# Patient Record
Sex: MALE | Race: BLACK OR AFRICAN AMERICAN | Employment: UNEMPLOYED | URBAN - METROPOLITAN AREA
[De-identification: names, ages, dates, MRNs, and addresses within clinical notes are randomized per-mention and may not be internally consistent; named-entity substitution may affect disease eponyms.]

---

## 2019-04-27 ENCOUNTER — HOSPITAL ENCOUNTER (EMERGENCY)
Age: 29
Discharge: HOME | End: 2019-04-27

## 2019-04-27 VITALS
WEIGHT: 180 LBS | OXYGEN SATURATION: 94 % | BODY MASS INDEX: 26.66 KG/M2 | DIASTOLIC BLOOD PRESSURE: 84 MMHG | TEMPERATURE: 98.1 F | SYSTOLIC BLOOD PRESSURE: 128 MMHG | HEIGHT: 69 IN | HEART RATE: 83 BPM | RESPIRATION RATE: 16 BRPM

## 2019-04-27 DIAGNOSIS — J45.31 MILD PERSISTENT ASTHMA WITH EXACERBATION: Primary | ICD-10-CM

## 2019-04-27 PROCEDURE — 6370000000 HC RX 637 (ALT 250 FOR IP): Performed by: PHYSICIAN ASSISTANT

## 2019-04-27 PROCEDURE — 94640 AIRWAY INHALATION TREATMENT: CPT

## 2019-04-27 PROCEDURE — 99284 EMERGENCY DEPT VISIT MOD MDM: CPT

## 2019-04-27 PROCEDURE — 94760 N-INVAS EAR/PLS OXIMETRY 1: CPT

## 2019-04-27 RX ORDER — BUDESONIDE AND FORMOTEROL FUMARATE DIHYDRATE 160; 4.5 UG/1; UG/1
2 AEROSOL RESPIRATORY (INHALATION) 2 TIMES DAILY
Qty: 1 INHALER | Refills: 1 | Status: SHIPPED | OUTPATIENT
Start: 2019-04-27 | End: 2020-01-02 | Stop reason: ALTCHOICE

## 2019-04-27 RX ORDER — ALBUTEROL SULFATE 90 UG/1
2 AEROSOL, METERED RESPIRATORY (INHALATION) EVERY 6 HOURS PRN
Qty: 1 INHALER | Refills: 1 | Status: SHIPPED | OUTPATIENT
Start: 2019-04-27

## 2019-04-27 RX ORDER — IPRATROPIUM BROMIDE AND ALBUTEROL SULFATE 2.5; .5 MG/3ML; MG/3ML
1 SOLUTION RESPIRATORY (INHALATION) ONCE
Status: COMPLETED | OUTPATIENT
Start: 2019-04-27 | End: 2019-04-27

## 2019-04-27 RX ORDER — PREDNISONE 20 MG/1
60 TABLET ORAL ONCE
Status: COMPLETED | OUTPATIENT
Start: 2019-04-27 | End: 2019-04-27

## 2019-04-27 RX ORDER — PREDNISONE 10 MG/1
60 TABLET ORAL DAILY
Qty: 30 TABLET | Refills: 0 | Status: SHIPPED | OUTPATIENT
Start: 2019-04-27 | End: 2019-05-02

## 2019-04-27 RX ADMIN — PREDNISONE 60 MG: 20 TABLET ORAL at 09:57

## 2019-04-27 RX ADMIN — IPRATROPIUM BROMIDE AND ALBUTEROL SULFATE 1 AMPULE: .5; 3 SOLUTION RESPIRATORY (INHALATION) at 09:51

## 2019-04-27 ASSESSMENT — ENCOUNTER SYMPTOMS
SHORTNESS OF BREATH: 1
ALLERGIC/IMMUNOLOGIC NEGATIVE: 1
COLOR CHANGE: 0
COUGH: 1
BACK PAIN: 0
ABDOMINAL DISTENTION: 0
ABDOMINAL PAIN: 0
WHEEZING: 1
DIARRHEA: 0
NAUSEA: 0
VOMITING: 0
STRIDOR: 0
CONSTIPATION: 0

## 2019-04-27 NOTE — ED PROVIDER NOTES
**EVALUATED BY ADVANCED PRACTICE PROVIDER**        [facility]  eMERGENCY dEPARTMENT eNCOUnter      Pt Name: Woody Rayo  [facility]:[id number]  [place] 1990  Date of evaluation: 4/27/2019  Provider: Terra Ovalle PA-C      Chief Complaint:    Chief Complaint   Patient presents with    Asthma     pt reports asthma flair up x1 week. typically uses symbicort but without issurance was $200 to fill and couldn't afford. Nursing Notes, Past Medical Hx, Past Surgical Hx, Social Hx, Allergies, and Family Hx were all reviewed and agreed with or any disagreements were addressed in the HPI.    HPI:  (Location, Duration, Timing, Severity,Quality, Assoc Sx, Context, Modifying factors)  This is a  34 y.o. male with history of asthma who presents to the emergency department stating that he ran out of symbicort several months ago and has not been able to fill it due to financial reasons. He reports coughing spells and wheezing for one week. He has no chest pain, palpitations, hemoptysis, fever, chills, abdominal pain, nausea, vomiting, pain or swelling in extremities, unexpected weight gain or weight loss. PastMedical/Surgical History:      Diagnosis Date    Asthma     Chlamydia 11/04/2016     History reviewed. No pertinent surgical history. Medications:  Discharge Medication List as of 4/27/2019  9:58 AM      CONTINUE these medications which have NOT CHANGED    Details   mometasone (ASMANEX 120 METERED DOSES) 220 MCG/INH inhaler Inhale 2 puffs into the lungs daily, Inhalation, DAILY Starting 2/16/2016, Until Thu 3/17/16, Disp-1 Inhaler, R-11, Normal      levalbuterol (XOPENEX HFA) 45 MCG/ACT inhaler Inhale 2 puffs into the lungs every 6 hours as needed for Wheezing, Disp-1 Inhaler, R-11               Review of Systems:  Review of Systems   Constitutional: Negative for chills and fever. HENT: Negative. Eyes: Negative for visual disturbance.    Respiratory: Positive for cough, shortness of breath and wheezing. Negative for stridor. Cardiovascular: Negative for chest pain, palpitations and leg swelling. Gastrointestinal: Negative for abdominal distention, abdominal pain, constipation, diarrhea, nausea and vomiting. Endocrine: Negative. Genitourinary: Negative. Musculoskeletal: Negative for back pain, neck pain and neck stiffness. Skin: Negative for color change, pallor, rash and wound. Allergic/Immunologic: Negative. Neurological: Negative for dizziness, tremors, seizures, syncope, facial asymmetry, speech difficulty, weakness, light-headedness, numbness and headaches. Hematological: Negative. Psychiatric/Behavioral: Negative for confusion. All other systems reviewed and are negative. Positives and Pertinent negatives as per HPI. Except as noted above in the ROS, problem specific ROS was completed and is negative. Physical Exam:  Physical Exam   Constitutional: He is oriented to person, place, and time. He appears well-developed and well-nourished. No distress. HENT:   Head: Normocephalic and atraumatic. Right Ear: External ear normal.   Left Ear: External ear normal.   Nose: Nose normal.   Mouth/Throat: Oropharynx is clear and moist.   Eyes: Pupils are equal, round, and reactive to light. Conjunctivae and EOM are normal. Right eye exhibits no discharge. Left eye exhibits no discharge. No scleral icterus. Neck: Normal range of motion. No JVD present. No Brudzinski's sign and no Kernig's sign noted. Cardiovascular: Normal rate and regular rhythm. Pulmonary/Chest: Effort normal. He has wheezes (diffusely scattered bilateral). He has no rales. Abdominal: Soft. Bowel sounds are normal. He exhibits no distension, no abdominal bruit and no pulsatile midline mass. There is no tenderness. There is no rigidity, no rebound, no guarding, no CVA tenderness, no tenderness at McBurney's point and negative Garduno's sign.    Musculoskeletal: Normal range of motion. No extremity edema, posterior calf or thigh tenderness, palpable cord, discoloration. Negative homans. Lymphadenopathy:     He has no cervical adenopathy. Neurological: He is alert and oriented to person, place, and time. Skin: Skin is warm and dry. Capillary refill takes less than 2 seconds. No rash noted. He is not diaphoretic. No erythema. No pallor. Psychiatric: He has a normal mood and affect. His behavior is normal.   Nursing note and vitals reviewed. MEDICAL DECISION MAKING    Vitals:    Vitals:    04/27/19 0922 04/27/19 0951   BP: 128/84    Pulse: 83    Resp: 16    Temp: 98.1 °F (36.7 °C)    TempSrc: Infrared    SpO2: 96% 94%   Weight: 180 lb (81.6 kg)    Height: 5' 9\" (1.753 m)        LABS:Labs Reviewed - No data to display     Remainder of labs reviewed and werenegative at this time or not returned at the time of this note. RADIOLOGY:   Non-plain film images such as CT, Ultrasound and MRI are read by the radiologist. Maged Marks PA-C have directly visualized the radiologic plain film image(s) with the below findings:        Interpretation per the Radiologist below, if available at the time of thisnote:    No orders to display        No results found. MEDICAL DECISION MAKING / ED COURSE:      PROCEDURES:   Procedures    None    Patient was given:     Medications   ipratropium-albuterol (DUONEB) nebulizer solution 1 ampule (1 ampule Inhalation Given 4/27/19 0951)   predniSONE (DELTASONE) tablet 60 mg (60 mg Oral Given 4/27/19 0957)       This patient presents complaining of cough, shortness of breath and wheezing. After breathing treatment and steroids, patient is feeling better and lung sounds are significantly improved. He is not significant hypoxic. I did refill his breathing treatments at home and added steroid to the regimen. I do not feel antibiotics are warranted.   My suspicion is low for pneumonia, pertussis,ACS, PE, myocarditis, pericarditis, endocarditis, acute pulmonary edema, pleural effusion, pericardial effusion, cardiac tamponade, cardiomyopathy, CHF exacerbation, thoracic aortic dissection, esophageal rupture, other life-threatening arrhythmia, hypertensive urgency or emergency, hemothorax, pulmonary contusion, subcutaneous emphysema, flail chest, pneumo mediastinum, rib fracture,acute strep pharyngitis, peritonsillar or tonsillar abscess, retropharyngeal abscess, bacterial tracheitis, epiglottitis, meningitis, encephalitis, foreign body, angioedema, anaphylaxis, mononucleosis, mumps, lymphoma, leukemia, asthma exacerbation, osteomyelitis, mastoiditis, sepsis, DKA, or other concerning pathology. We have addressed concerns and expectations. The patient tolerated their visit well. I evaluated the patient. The physician was available for consultation as needed. The patient and / or the family were informed of the results of anytests, a time was given to answer questions, a plan was proposed and they agreed with plan. CLINICAL IMPRESSION:  1.  Mild persistent asthma with exacerbation        DISPOSITION Decision To Discharge 04/27/2019 09:34:21 AM      PATIENT REFERRED TO:  [facility]) Pre-Services  [phone]          DISCHARGE MEDICATIONS:  Discharge Medication List as of 4/27/2019  9:58 AM      START taking these medications    Details   predniSONE (DELTASONE) 10 MG tablet Take 6 tablets by mouth daily for 5 doses, Disp-30 tablet, R-0Print             DISCONTINUED MEDICATIONS:  Discharge Medication List as of 4/27/2019  9:58 AM                 (Please note the MDM and HPI sections of this note were completed with a voice recognition program.  Efforts weremade to edit the dictations but occasionally words are mis-transcribed.)    Electronically signed, Soco Vergara PA-C,          Soco Vergara PA-C  04/27/19 1156

## 2019-04-27 NOTE — ED NOTES
Pt Discharged in stable condition, VSS, no signs of distress, discharge instructions and meds reviewed. Pt verbalizes understanding and states no further questions or concerns unaddressed. Pt ambulated to car by self.      Mya Hewitt RN  04/27/19 1002

## 2019-04-27 NOTE — ED NOTES
Pt states feeling better after breathing tx. Medicated per orders.       Anson Medeiros RN  04/27/19 6428

## 2019-07-10 ENCOUNTER — HOSPITAL ENCOUNTER (EMERGENCY)
Age: 29
Discharge: HOME | End: 2019-07-10

## 2019-07-10 VITALS
SYSTOLIC BLOOD PRESSURE: 130 MMHG | WEIGHT: 190 LBS | HEART RATE: 78 BPM | BODY MASS INDEX: 28.06 KG/M2 | TEMPERATURE: 98.2 F | OXYGEN SATURATION: 98 % | DIASTOLIC BLOOD PRESSURE: 84 MMHG | RESPIRATION RATE: 16 BRPM

## 2019-07-10 DIAGNOSIS — J45.21 MILD INTERMITTENT ASTHMA WITH ACUTE EXACERBATION: Primary | ICD-10-CM

## 2019-07-10 PROCEDURE — 6370000000 HC RX 637 (ALT 250 FOR IP): Performed by: PHYSICIAN ASSISTANT

## 2019-07-10 PROCEDURE — 94640 AIRWAY INHALATION TREATMENT: CPT

## 2019-07-10 PROCEDURE — 6360000002 HC RX W HCPCS: Performed by: PHYSICIAN ASSISTANT

## 2019-07-10 PROCEDURE — 94761 N-INVAS EAR/PLS OXIMETRY MLT: CPT

## 2019-07-10 PROCEDURE — 99284 EMERGENCY DEPT VISIT MOD MDM: CPT

## 2019-07-10 RX ORDER — PREDNISONE 20 MG/1
60 TABLET ORAL ONCE
Status: COMPLETED | OUTPATIENT
Start: 2019-07-10 | End: 2019-07-10

## 2019-07-10 RX ORDER — IPRATROPIUM BROMIDE AND ALBUTEROL SULFATE 2.5; .5 MG/3ML; MG/3ML
1 SOLUTION RESPIRATORY (INHALATION) ONCE
Status: COMPLETED | OUTPATIENT
Start: 2019-07-10 | End: 2019-07-10

## 2019-07-10 RX ORDER — LORATADINE 10 MG/1
10 TABLET ORAL DAILY
Qty: 30 TABLET | Refills: 0 | Status: SHIPPED | OUTPATIENT
Start: 2019-07-10

## 2019-07-10 RX ORDER — PREDNISONE 10 MG/1
60 TABLET ORAL DAILY
Qty: 30 TABLET | Refills: 0 | Status: SHIPPED | OUTPATIENT
Start: 2019-07-10 | End: 2019-07-15

## 2019-07-10 RX ADMIN — IPRATROPIUM BROMIDE AND ALBUTEROL SULFATE 1 AMPULE: .5; 3 SOLUTION RESPIRATORY (INHALATION) at 22:42

## 2019-07-10 RX ADMIN — PREDNISONE 60 MG: 20 TABLET ORAL at 22:25

## 2019-07-10 RX ADMIN — ALBUTEROL SULFATE 5 MG: 2.5 SOLUTION RESPIRATORY (INHALATION) at 22:42

## 2019-07-11 ASSESSMENT — ENCOUNTER SYMPTOMS
NAUSEA: 0
WHEEZING: 1
COUGH: 0
SHORTNESS OF BREATH: 1
VOMITING: 0
ABDOMINAL PAIN: 0

## 2019-07-11 NOTE — ED PROVIDER NOTES
 Chlamydia 2016         SURGICAL HISTORY   History reviewed. No pertinent surgical history. [address]       Discharge Medication List as of 7/10/2019 11:12 PM      CONTINUE these medications which have NOT CHANGED    Details   albuterol sulfate HFA (PROVENTIL HFA) 108 (90 Base) MCG/ACT inhaler Inhale 2 puffs into the lungs every 6 hours as needed for Wheezing or Shortness of Breath May substitute ProAir MDI, Disp-1 Inhaler, R-1Print      budesonide-formoterol (SYMBICORT) 160-4.5 MCG/ACT AERO Inhale 2 puffs into the lungs 2 times daily, Disp-1 Inhaler, R-1Print      mometasone (ASMANEX 120 METERED DOSES) 220 MCG/INH inhaler Inhale 2 puffs into the lungs daily, Inhalation, DAILY Starting 2016, Until Thu 3/17/16, Disp-1 Inhaler, R-11, Normal      levalbuterol (XOPENEX HFA) 45 MCG/ACT inhaler Inhale 2 puffs into the lungs every 6 hours as needed for Wheezing, Disp-1 Inhaler, R-11               ALLERGIES     Shellfish-derived products    FAMILYHISTORY     History reviewed. No pertinent family history.        SOCIAL HISTORY       Social History     Socioeconomic History    Marital status: Single     Spouse name: None    Number of children: None    Years of education: None    Highest education level: None   Occupational History    None   Social Needs    Financial resource strain: None    Food insecurity:     Worry: None     Inability: None    Transportation needs:     Medical: None     Non-medical: None   Tobacco Use    Smoking status: Former Smoker     Packs/day: 0.00     Last attempt to quit: 2014     Years since quittin.0    Smokeless tobacco: Never Used   Substance and Sexual Activity    Alcohol use: Yes     Comment: occasionally    Drug use: No    Sexual activity: Yes     Partners: Female   Lifestyle    Physical activity:     Days per week: None     Minutes per session: None    Stress: None   Relationships    Social connections:     Talks on phone: None     Gets together: None     Attends [employer] service: None     Active member of club or organization: None     Attends meetings of clubs or organizations: None     Relationship status: None    Intimate partner violence:     Fear of current or ex partner: None     Emotionally abused: None     Physically abused: None     Forced sexual activity: None   Other Topics Concern    None   Social History Narrative    None       SCREENINGS             PHYSICAL EXAM    (up to 7 for level 4, 8 or more for level 5)     ED Triage Vitals [07/10/19 2120]   BP Temp Temp src Pulse Resp SpO2 Height Weight   130/84 98.2 °F (36.8 °C) -- 78 16 98 % -- 190 lb (86.2 kg)       Physical Exam   Constitutional: He is oriented to person, place, and time. He appears well-developed and well-nourished. No distress. HENT:   Head: Normocephalic and atraumatic. Nose: Nose normal.   Mouth/Throat: Oropharynx is clear and moist.   Eyes: Conjunctivae and EOM are normal. Right eye exhibits no discharge. Left eye exhibits no discharge. Neck: Normal range of motion. Neck supple. Cardiovascular: Normal rate, regular rhythm and normal heart sounds. Exam reveals no gallop. No murmur heard. Pulmonary/Chest: Effort normal. No respiratory distress. He has wheezes in the right upper field, the right lower field, the left upper field and the left lower field. He has no rhonchi. He has no rales. Abdominal: Soft. Musculoskeletal: Normal range of motion. Neurological: He is alert and oriented to person, place, and time. Skin: Skin is warm and dry. Capillary refill takes less than 2 seconds. No rash noted. He is not diaphoretic. No erythema. No pallor. Psychiatric: He has a normal mood and affect. His behavior is normal.   Nursing note and vitals reviewed. DIAGNOSTIC RESULTS   LABS:    Labs Reviewed - No data to display    All other labs were within normal range or not returned as of this dictation. EKG:  All EKG's are interpreted by the Emergency

## 2019-07-11 NOTE — ED NOTES
Reviewed discharge instructions with patient. No questions at this time. No sign of distress. AOx3. Pt ambulated to ER exit with steady gait.         Jarod Nash RN  07/10/19 9952

## 2019-11-17 ENCOUNTER — HOSPITAL ENCOUNTER (EMERGENCY)
Age: 29
Discharge: HOME | End: 2019-11-17
Payer: MEDICAID

## 2019-11-17 ENCOUNTER — APPOINTMENT (OUTPATIENT)
Dept: GENERAL RADIOLOGY | Age: 29
End: 2019-11-17
Payer: MEDICAID

## 2019-11-17 VITALS
RESPIRATION RATE: 16 BRPM | HEIGHT: 69 IN | WEIGHT: 178.79 LBS | DIASTOLIC BLOOD PRESSURE: 82 MMHG | SYSTOLIC BLOOD PRESSURE: 144 MMHG | BODY MASS INDEX: 26.48 KG/M2 | OXYGEN SATURATION: 93 % | HEART RATE: 78 BPM | TEMPERATURE: 97.9 F

## 2019-11-17 DIAGNOSIS — J45.41 MODERATE PERSISTENT ASTHMA WITH EXACERBATION: Primary | ICD-10-CM

## 2019-11-17 PROCEDURE — 6360000002 HC RX W HCPCS: Performed by: PHYSICIAN ASSISTANT

## 2019-11-17 PROCEDURE — 94640 AIRWAY INHALATION TREATMENT: CPT

## 2019-11-17 PROCEDURE — 6370000000 HC RX 637 (ALT 250 FOR IP): Performed by: PHYSICIAN ASSISTANT

## 2019-11-17 PROCEDURE — 99285 EMERGENCY DEPT VISIT HI MDM: CPT

## 2019-11-17 PROCEDURE — 71046 X-RAY EXAM CHEST 2 VIEWS: CPT

## 2019-11-17 PROCEDURE — 94760 N-INVAS EAR/PLS OXIMETRY 1: CPT

## 2019-11-17 RX ORDER — PREDNISONE 10 MG/1
TABLET ORAL
Qty: 18 TABLET | Refills: 0 | Status: SHIPPED | OUTPATIENT
Start: 2019-11-17 | End: 2019-11-27

## 2019-11-17 RX ORDER — IPRATROPIUM BROMIDE AND ALBUTEROL SULFATE 2.5; .5 MG/3ML; MG/3ML
1 SOLUTION RESPIRATORY (INHALATION) ONCE
Status: COMPLETED | OUTPATIENT
Start: 2019-11-17 | End: 2019-11-17

## 2019-11-17 RX ORDER — MONTELUKAST SODIUM 10 MG/1
10 TABLET ORAL NIGHTLY
Qty: 30 TABLET | Refills: 0 | Status: SHIPPED | OUTPATIENT
Start: 2019-11-17

## 2019-11-17 RX ORDER — PREDNISONE 20 MG/1
40 TABLET ORAL ONCE
Status: COMPLETED | OUTPATIENT
Start: 2019-11-17 | End: 2019-11-17

## 2019-11-17 RX ORDER — ALBUTEROL SULFATE 2.5 MG/3ML
5 SOLUTION RESPIRATORY (INHALATION) ONCE
Status: COMPLETED | OUTPATIENT
Start: 2019-11-17 | End: 2019-11-17

## 2019-11-17 RX ADMIN — PREDNISONE 40 MG: 20 TABLET ORAL at 17:51

## 2019-11-17 RX ADMIN — IPRATROPIUM BROMIDE AND ALBUTEROL SULFATE 1 AMPULE: 2.5; .5 SOLUTION RESPIRATORY (INHALATION) at 17:24

## 2019-11-17 RX ADMIN — ALBUTEROL SULFATE 5 MG: 2.5 SOLUTION RESPIRATORY (INHALATION) at 17:24

## 2020-01-02 ENCOUNTER — APPOINTMENT (OUTPATIENT)
Dept: GENERAL RADIOLOGY | Age: 30
End: 2020-01-02
Payer: MEDICAID

## 2020-01-02 ENCOUNTER — HOSPITAL ENCOUNTER (EMERGENCY)
Age: 30
Discharge: HOME | End: 2020-01-02
Payer: MEDICAID

## 2020-01-02 VITALS
OXYGEN SATURATION: 96 % | BODY MASS INDEX: 25.99 KG/M2 | HEIGHT: 69 IN | DIASTOLIC BLOOD PRESSURE: 92 MMHG | SYSTOLIC BLOOD PRESSURE: 129 MMHG | WEIGHT: 175.44 LBS | RESPIRATION RATE: 16 BRPM | TEMPERATURE: 98.1 F | HEART RATE: 80 BPM

## 2020-01-02 LAB
RAPID INFLUENZA  B AGN: NEGATIVE
RAPID INFLUENZA A AGN: NEGATIVE

## 2020-01-02 PROCEDURE — 94760 N-INVAS EAR/PLS OXIMETRY 1: CPT

## 2020-01-02 PROCEDURE — 87804 INFLUENZA ASSAY W/OPTIC: CPT

## 2020-01-02 PROCEDURE — 6370000000 HC RX 637 (ALT 250 FOR IP): Performed by: PHYSICIAN ASSISTANT

## 2020-01-02 PROCEDURE — 71046 X-RAY EXAM CHEST 2 VIEWS: CPT

## 2020-01-02 PROCEDURE — 99284 EMERGENCY DEPT VISIT MOD MDM: CPT

## 2020-01-02 PROCEDURE — 94640 AIRWAY INHALATION TREATMENT: CPT

## 2020-01-02 RX ORDER — IPRATROPIUM BROMIDE AND ALBUTEROL SULFATE 2.5; .5 MG/3ML; MG/3ML
1 SOLUTION RESPIRATORY (INHALATION) ONCE
Status: COMPLETED | OUTPATIENT
Start: 2020-01-02 | End: 2020-01-02

## 2020-01-02 RX ORDER — ALBUTEROL SULFATE 90 UG/1
2 AEROSOL, METERED RESPIRATORY (INHALATION) EVERY 4 HOURS PRN
Qty: 1 INHALER | Refills: 0 | Status: SHIPPED | OUTPATIENT
Start: 2020-01-02

## 2020-01-02 RX ORDER — PREDNISONE 20 MG/1
60 TABLET ORAL ONCE
Status: COMPLETED | OUTPATIENT
Start: 2020-01-02 | End: 2020-01-02

## 2020-01-02 RX ORDER — PREDNISONE 10 MG/1
60 TABLET ORAL DAILY
Qty: 30 TABLET | Refills: 0 | Status: SHIPPED | OUTPATIENT
Start: 2020-01-02 | End: 2020-01-07

## 2020-01-02 RX ADMIN — PREDNISONE 60 MG: 20 TABLET ORAL at 16:49

## 2020-01-02 RX ADMIN — IPRATROPIUM BROMIDE AND ALBUTEROL SULFATE 1 AMPULE: .5; 3 SOLUTION RESPIRATORY (INHALATION) at 16:42

## 2020-01-02 ASSESSMENT — ENCOUNTER SYMPTOMS
CHEST TIGHTNESS: 1
RHINORRHEA: 0
DIARRHEA: 0
VOMITING: 0
WHEEZING: 1
SHORTNESS OF BREATH: 1
COUGH: 1
ABDOMINAL PAIN: 0
NAUSEA: 0

## 2020-01-02 NOTE — ED PROVIDER NOTES
[address] [facility]        Pt Name: Roe Abreu  MRN: [id number]  [place] 1990  Date of evaluation: 1/2/2020  Provider: Allen West PA-C  PCP: No primary care provider on file. This patient was not seen and evaluated by the attending physician No att. providers found. CHIEF COMPLAINT       Chief Complaint   Patient presents with    Asthma     Pt. comes in today with a possible asthma exacerbation. Pt. reports that he has been using his inhaler over the last couple of days with minimal relief. Pt. coughing up some yellow mucous. Pt. denies fever or chills. HISTORY OF PRESENT ILLNESS   (Location/Symptom, Timing/Onset, Context/Setting, Quality, Duration, Modifying Factors, Severity)  Note limiting factors. Roe Abreu is a 34 y.o. male history of asthma presents for evaluation of cough and congestion over the past several days. He states that he has been using his inhaler without significant symptomatic relief. Does have some chest tightness and wheezing. Coughing up yellow mucus. He denies fevers or chills. No abdominal pain, nausea or vomiting. No sore throat, difficulty swallowing, drooling or voice changes. No rashes. No known sick contacts with similar symptoms. The last was on oral steroids about 2 months ago. He has had to be admitted for similar symptoms in the past.  He is supposed to be taking Symbicort but states that he has not been able to afford this due to not having insurance. He has no other complaints or concerns at this time. Nursing Notes were all reviewed and agreed with or any disagreements were addressed in the HPI. REVIEW OF SYSTEMS    (2-9 systems for level 4, 10 or more for level 5)     Review of Systems   Constitutional: Negative for appetite change, chills and fever. HENT: Positive for congestion. Negative for rhinorrhea.     Respiratory: Positive for cough, chest tightness, shortness of breath and wheezing. Cardiovascular: Negative for chest pain. Gastrointestinal: Negative for abdominal pain, diarrhea, nausea and vomiting. Genitourinary: Negative for difficulty urinating, dysuria and hematuria. Musculoskeletal: Negative for neck pain and neck stiffness. Skin: Negative for rash. Neurological: Negative for headaches. Positives and Pertinent negatives as per HPI. Except as noted above in the ROS, all other systems were reviewed and negative. PAST MEDICAL HISTORY     Past Medical History:   Diagnosis Date    Asthma     Chlamydia 2016         SURGICAL HISTORY   History reviewed. No pertinent surgical history. CURRENTMEDICATIONS       Previous Medications    ALBUTEROL SULFATE HFA (PROVENTIL HFA) 108 (90 BASE) MCG/ACT INHALER    Inhale 2 puffs into the lungs every 6 hours as needed for Wheezing or Shortness of Breath May substitute ProAir MDI    LEVALBUTEROL (XOPENEX HFA) 45 MCG/ACT INHALER    Inhale 2 puffs into the lungs every 6 hours as needed for Wheezing    LORATADINE (CLARITIN) 10 MG TABLET    Take 1 tablet by mouth daily    MOMETASONE (ASMANEX 120 METERED DOSES) 220 MCG/INH INHALER    Inhale 2 puffs into the lungs daily    MONTELUKAST (SINGULAIR) 10 MG TABLET    Take 1 tablet by mouth nightly         ALLERGIES     Shellfish-derived products    FAMILYHISTORY     History reviewed. No pertinent family history.        SOCIAL HISTORY       Social History     Tobacco Use    Smoking status: Former Smoker     Packs/day: 0.00     Last attempt to quit: 2014     Years since quittin.5    Smokeless tobacco: Never Used   Substance Use Topics    Alcohol use: Yes     Comment: occasionally    Drug use: No       SCREENINGS             PHYSICAL EXAM    (up to 7 for level 4, 8 or more for level 5)     ED Triage Vitals [20 1617]   BP Temp Temp Source Pulse Resp SpO2 Height Weight   (!) 129/92 98.1 °F (36.7 °C) Infrared 80 16 95 % 5' note:    XR CHEST STANDARD (2 VW)   Final Result   No acute abnormality           No results found. PROCEDURES   Unless otherwise noted below, none     Procedures    CRITICAL CARE TIME   N/A    CONSULTS:  None      EMERGENCY DEPARTMENT COURSE and DIFFERENTIAL DIAGNOSIS/MDM:   Vitals:    Vitals:    01/02/20 1617 01/02/20 1643   BP: (!) 129/92    Pulse: 80    Resp: 16    Temp: 98.1 °F (36.7 °C)    TempSrc: Infrared    SpO2: 95% 96%   Weight: 175 lb 7 oz (79.6 kg)    Height: 5' 9\" (1.753 m)        Patient was given thefollowing medications:  Medications   mometasone-formoterol (DULERA) 200-5 MCG/ACT inhaler 2 puff (has no administration in time range)   predniSONE (DELTASONE) tablet 60 mg (60 mg Oral Given 1/2/20 1649)   ipratropium-albuterol (DUONEB) nebulizer solution 1 ampule (1 ampule Inhalation Given 1/2/20 1642)       Patient presents for evaluation of suspected asthma exacerbation with cough congestion. On exam, he is resting comfortably in bed no acute distress and nontoxic. Vitals are stable and he is afebrile. He has faint expiratory wheezing to bilateral bases with good aeration. No obvious rales or rhonchi. Chest is nontender and abdomen is benign. He was given prednisone and DuoNeb breathing treatment for symptomatic relief and will be reevaluated. Patient did have improvement on reevaluation. Rapid flu negative. Chest x-ray shows no acute abnormality. Symptomatic and supportive care was discussed. He is given prednisone burst, refill of albuterol inhaler and prescription for Dulera. I estimate there is LOW risk for PNEUMONIA, MENINGITIS, PERITONSILLAR ABSCESS, SEPSIS, MALIGNANT OTITIS EXTERNA, OR EPIGLOTTITIS thus I consider the discharge disposition reasonable. Based on clinical presentation, physical exam and diagnostics, the patient likely has a viral illness.  I discussed symptomatic treatment, fluids, and rest. Patient is advised to follow-up with their family doctor within 24-48 hours and return to the ER if he does not improve as anticipated over the next several days, develops difficulty breathing, weakness, inability to take liquids, or has other concerns. He was given primary health [facility] and PCP contact information for reevaluation and to establish primary care. He is agreeable to this plan and stable for discharge at this time. FINAL IMPRESSION      1. Exacerbation of asthma, unspecified asthma severity, unspecified whether persistent    2. Viral URI with cough    3.  Encounter for medication refill          DISPOSITION/PLAN   DISPOSITION  01/02/2020 05:26:37 PM      PATIENT REFERREDTO:  [facility] Emergency Department  [address]  Go to   If symptoms worsen    [facility]) Pre[facility]Services  [phone]          DISCHARGE MEDICATIONS:  New Prescriptions    MOMETASONE-FORMOTEROL (DULERA) 200-5 MCG/ACT INHALER    Inhale 2 puffs into the lungs 2 times daily    PREDNISONE (DELTASONE) 10 MG TABLET    Take 6 tablets by mouth daily for 5 doses       DISCONTINUED MEDICATIONS:  Discontinued Medications    BUDESONIDE-FORMOTEROL (SYMBICORT) 160-4.5 MCG/ACT AERO    Inhale 2 puffs into the lungs 2 times daily              (Please note that portions of this note were completed with a voice recognition program.  Efforts were made to edit the dictations but occasionally words are mis-transcribed.)    Allen West PA-C (electronically signed)           Chelle Thacker PA-C  01/02/20 2013

## 2020-01-02 NOTE — ED NOTES
Pt discharged in stable condition, VSS, no signs of distress. Discharge instructions and meds reviewed. Pt verbalizes understanding and states no further questions or concerns unaddressed.        Dania Lau RN  01/02/20 3340

## 2023-02-06 ENCOUNTER — HOSPITAL ENCOUNTER (EMERGENCY)
Age: 33
Discharge: HOME | End: 2023-02-06
Payer: MEDICAID

## 2023-02-06 VITALS
SYSTOLIC BLOOD PRESSURE: 144 MMHG | BODY MASS INDEX: 27.85 KG/M2 | OXYGEN SATURATION: 100 % | DIASTOLIC BLOOD PRESSURE: 95 MMHG | RESPIRATION RATE: 20 BRPM | HEIGHT: 69 IN | HEART RATE: 71 BPM | TEMPERATURE: 98.5 F | WEIGHT: 188 LBS

## 2023-02-06 DIAGNOSIS — K02.9 PAIN DUE TO DENTAL CARIES: Primary | ICD-10-CM

## 2023-02-06 PROCEDURE — 99283 EMERGENCY DEPT VISIT LOW MDM: CPT

## 2023-02-06 PROCEDURE — 6370000000 HC RX 637 (ALT 250 FOR IP): Performed by: PHYSICIAN ASSISTANT

## 2023-02-06 RX ORDER — HYDROCODONE BITARTRATE AND ACETAMINOPHEN 5; 325 MG/1; MG/1
2 TABLET ORAL ONCE
Status: COMPLETED | OUTPATIENT
Start: 2023-02-06 | End: 2023-02-06

## 2023-02-06 RX ORDER — BUDESONIDE AND FORMOTEROL FUMARATE DIHYDRATE 160; 4.5 UG/1; UG/1
2 AEROSOL RESPIRATORY (INHALATION) 2 TIMES DAILY
COMMUNITY

## 2023-02-06 RX ORDER — AMOXICILLIN 500 MG/1
CAPSULE ORAL
COMMUNITY
Start: 2023-02-06

## 2023-02-06 RX ADMIN — HYDROCODONE BITARTRATE AND ACETAMINOPHEN 2 TABLET: 5; 325 TABLET ORAL at 17:09

## 2023-02-06 ASSESSMENT — PAIN - FUNCTIONAL ASSESSMENT: PAIN_FUNCTIONAL_ASSESSMENT: 0-10

## 2023-02-06 ASSESSMENT — PAIN DESCRIPTION - DESCRIPTORS: DESCRIPTORS: THROBBING;SHOOTING;ACHING

## 2023-02-06 ASSESSMENT — PAIN DESCRIPTION - PAIN TYPE: TYPE: ACUTE PAIN

## 2023-02-06 ASSESSMENT — PAIN DESCRIPTION - LOCATION: LOCATION: MOUTH

## 2023-02-06 ASSESSMENT — PAIN SCALES - GENERAL: PAINLEVEL_OUTOF10: 10

## 2023-02-06 ASSESSMENT — PAIN DESCRIPTION - ORIENTATION: ORIENTATION: RIGHT

## 2023-02-06 ASSESSMENT — ENCOUNTER SYMPTOMS
CHEST TIGHTNESS: 0
NAUSEA: 0
SHORTNESS OF BREATH: 0
VOMITING: 0
DIARRHEA: 0
ABDOMINAL PAIN: 0

## 2023-02-06 ASSESSMENT — LIFESTYLE VARIABLES: HOW OFTEN DO YOU HAVE A DRINK CONTAINING ALCOHOL: MONTHLY OR LESS

## 2023-02-06 NOTE — DISCHARGE INSTRUCTIONS
Toothache    Toothaches are generally caused by tooth decay. If you have severe pain or swelling around a tooth, you may have a deep tooth infection. Tooth decay and infections require evaluation and treatment by a dentist or an oral surgeon. The emergency department will provide you with the best possible care available for your dental problem. Unfortunately, there is not a dentist or dental clinic available in the department. Routine dental care, such as fillings, tooth extractions, or root canals are not available in the emergency department. However, we are avaialbe for emergencies including abscesses, fractures of the jaw and other oral trauma such as a tooth that is knocked out. Any other dental problem is best treated by a dentist.  Please see the list of dental clinics in the area if you do not currently have a dentist.      Treatment That Can Be Provided for Toothache in the Emergency Department    If you have a severe toothache, medication may be prescribed for you until you are able to see a dentist.  If you are given an antibiotic, take it as prescribed and continue to take it until gone. Even if you start to feel better, your toothache will need to be treated by a dentist or an oral surgeon. Pain medication may be prescribed for you. As is the policy of the [facility] Department, the emergency department uses nonsteroidal anti-inflammatory medication (NSAIDs) as the primary medication for pain. These may include ibuprofen (Motrin ® ) or naproxyn sodium (Naprosyn ® ). Patients who are unable to take nonsteroidal anti-inflammatory medications will generally be advised to take acetaminophen (Tylenol ® ) for dental pain. As is the policy of the [address] [place] [address], the hospitals emergency department policy strongly discourages the use of the narcotic medications. (Tylenol #3 ® , Vicodin ® , etc) are restricted by specific, strict guidelines.     If you have any questions concerning these instructions, call the emergency department at [facility] @ [phone]. Consult your care giver regarding these instructions and seek your physicians advice regarding medical care. Dental Referrals  Following is a list of local clinics that treat dental problems. Many also have specific emergency hours. For an appointment or for hours of operation, contact the clinic. [address] [facility]  [phone]  [facility]  [address]ul Mims 37    [facility] (referral agency)  [address] [facility], [address] [place]  [phone] or [phone]  (Application Process, Must Qualify)   [facility]  [address]:      [facility]  [address]  [place], [address] [facility]  [phone]     [facility] AT [facility] 99  [facility] 71    [address]  [phone] ext. [address]  [facility] 167. [place], [address] [facility] 39  8 [facility], [address] [facility]  Marleny Harrison 61  [address] [facility] 50 [place],[address]Th Floor  [facility]  10 [facility], [address] [facility]  [phone] or [phone]  (Must qualify)        Clinics:      [address] [place], [place] [address]  [facility], [address]  [address] Outpatient, [place] [address] [facility] 860 601 [facility],9Th Floor, [facility]  [address]  [address]   [place], [address]  [address] Outpatient, 139 [facility],  [address]  [place] [place] [facility]  [phone]

## 2023-02-06 NOTE — ED PROVIDER NOTES
[address] [facility]        Pt Name: Clifm Lennox  MRN: [id number]  [place] 1990  Date of evaluation: 2/6/2023  Provider: Magalys Palafox PA-C  PCP: No primary care provider on file. Note Started: 5:12 PM EST 2/6/23      ALEXIS. I have evaluated this patient. My supervising physician was available for consultation. CHIEF COMPLAINT       Chief Complaint   Patient presents with    Dental Pain     Arrived per self d/t dental pain on the right side of mouth; went to walk in dental and was told to go to urgent care and was prescribed Amoxicillin and started taking around 1000; Took ibuprofen without relief        HISTORY OF PRESENT ILLNESS: 1 or more Elements     History from : Patient    Limitations to history : None    Clifm Lennox is a 29 y.o. male who presents to the emergency department today complaining of dental pain. Patient went to urgent care yesterday and was prescribed amoxicillin and ibuprofen. He states he took the medication today but the ibuprofen is not helping with the pain. He does have an appointment with dental clinic on Friday. He denies facial swelling. He denies throat swelling or difficulty swallowing. He denies chest pain or shortness of breath. Nursing Notes were all reviewed and agreed with or any disagreements were addressed in the HPI. REVIEW OF SYSTEMS :      Review of Systems   Constitutional:  Negative for chills and fever. HENT:  Positive for dental problem. Respiratory:  Negative for chest tightness and shortness of breath. Cardiovascular:  Negative for chest pain. Gastrointestinal:  Negative for abdominal pain, diarrhea, nausea and vomiting. Genitourinary:  Negative for dysuria. Neurological:  Negative for dizziness, light-headedness and headaches. All other systems reviewed and are negative. Positives and Pertinent negatives as per HPI.      SURGICAL HISTORY History reviewed. No pertinent surgical history. CURRENTMEDICATIONS       Previous Medications    ALBUTEROL SULFATE HFA (PROVENTIL HFA) 108 (90 BASE) MCG/ACT INHALER    Inhale 2 puffs into the lungs every 6 hours as needed for Wheezing or Shortness of Breath May substitute ProAir MDI    ALBUTEROL SULFATE HFA (PROVENTIL HFA) 108 (90 BASE) MCG/ACT INHALER    Inhale 2 puffs into the lungs every 4 hours as needed for Wheezing or Shortness of Breath (Space out to every 6 hours as symptoms improve) Space out to every 6 hours as symptoms improve. AMOXICILLIN (AMOXIL) 500 MG CAPSULE        BUDESONIDE-FORMOTEROL (SYMBICORT) 160-4.5 MCG/ACT AERO    Inhale 2 puffs into the lungs 2 times daily    LEVALBUTEROL (XOPENEX HFA) 45 MCG/ACT INHALER    Inhale 2 puffs into the lungs every 6 hours as needed for Wheezing    LORATADINE (CLARITIN) 10 MG TABLET    Take 1 tablet by mouth daily    MOMETASONE (ASMANEX 120 METERED DOSES) 220 MCG/INH INHALER    Inhale 2 puffs into the lungs daily    MOMETASONE-FORMOTEROL (DULERA) 200-5 MCG/ACT INHALER    Inhale 2 puffs into the lungs 2 times daily    MONTELUKAST (SINGULAIR) 10 MG TABLET    Take 1 tablet by mouth nightly       ALLERGIES     Shellfish-derived products    FAMILYHISTORY     History reviewed. No pertinent family history.      SOCIAL HISTORY       Social History     Tobacco Use    Smoking status: Every Day     Packs/day: 0.00     Types: Cigarettes     Last attempt to quit: 2014     Years since quittin.6    Smokeless tobacco: Never   Vaping Use    Vaping Use: Never used   Substance Use Topics    Alcohol use: Yes     Comment: occasionally    Drug use: No       SCREENINGS        Jose Coma Scale  Eye Opening: Spontaneous  Best Verbal Response: Oriented  Best Motor Response: Obeys commands  [place] Coma Scale Score: 15                CIWA Assessment  BP: (!) 144/95  Heart Rate: 71           PHYSICAL EXAM  1 or more Elements     ED Triage Vitals [23 1653]   BP Temp Temp Source Heart Rate Resp SpO2 Height Weight   (!) 144/95 98.5 °F (36.9 °C) Oral 71 20 100 % 5' 9\" (1.753 m) 188 lb (85.3 kg)       Physical Exam  Vitals and nursing note reviewed. Constitutional:       Appearance: He is well-developed. He is not diaphoretic. HENT:      Head: Normocephalic and atraumatic. Mouth/Throat:      Mouth: Mucous membranes are moist.      Dentition: Abnormal dentition. Dental tenderness and dental caries present. Comments: Patient points to his right mandibular molar. This tooth has severe decay. Has previously been filled. There is mild gingival swelling and erythema. There is no obvious dental abscess. There is no sign of Tyler's angina  Eyes:      General:         Right eye: No discharge. Left eye: No discharge. Extraocular Movements: Extraocular movements intact. Conjunctiva/sclera: Conjunctivae normal.      Pupils: Pupils are equal, round, and reactive to light. Cardiovascular:      Rate and Rhythm: Normal rate and regular rhythm. Pulses: Normal pulses. Heart sounds: Normal heart sounds. Pulmonary:      Effort: Pulmonary effort is normal. No respiratory distress. Breath sounds: Normal breath sounds. Abdominal:      General: Abdomen is flat. Bowel sounds are normal.      Palpations: Abdomen is soft. Musculoskeletal:         General: Normal range of motion. Cervical back: Normal range of motion and neck supple. Skin:     General: Skin is warm and dry. Coloration: Skin is not pale. Neurological:      Mental Status: He is alert and oriented to person, place, and time. Psychiatric:         Behavior: Behavior normal.           DIAGNOSTIC RESULTS   LABS:    Labs Reviewed - No data to display    When ordered only abnormal lab results are displayed. All other labs were within normal range or not returned as of this dictation. EKG:  When ordered, EKG's are interpreted by the Emergency Department Physician in the absence of a cardiologist.  Please see their note for interpretation of EKG. RADIOLOGY:   Non-plain film images such as CT, Ultrasound and MRI are read by the radiologist. Plain radiographic images are visualized and preliminarily interpreted by the ED Provider with the below findings:        Interpretation per the Radiologist below, if available at the time of this note:    No orders to display     No results found. No results found. PROCEDURES   Unless otherwise noted below, none     Procedures    CRITICAL CARE TIME (.cctime)       PAST MEDICAL HISTORY      has a past medical history of Asthma and Chlamydia (11/04/2016). Chronic Conditions affecting Care: None    EMERGENCY DEPARTMENT COURSE and DIFFERENTIAL DIAGNOSIS/MDM:   Vitals:    Vitals:    02/06/23 1653   BP: (!) 144/95   Pulse: 71   Resp: 20   Temp: 98.5 °F (36.9 °C)   TempSrc: Oral   SpO2: 100%   Weight: 188 lb (85.3 kg)   Height: 5' 9\" (1.753 m)       Patient was given the following medications:  Medications   HYDROcodone-acetaminophen (NORCO) 5-325 MG per tablet 2 tablet (2 tablets Oral Given 2/6/23 1709)             Is this patient to be included in the SEP-1 Core Measure due to severe sepsis or septic shock? No   Exclusion criteria - the patient is NOT to be included for SEP-1 Core Measure due to: Infection is not suspected    CONSULTS: (Who and What was discussed)  None  Discussion with Other Profesionals : None    Social Determinants : None    Records Reviewed : None    CC/HPI Summary, DDx, ED Course, and Reassessment: Patient presented to the emergency department today complaining of dental pain that is not controlled with ibuprofen. He just started amoxicillin today. There are no signs of Tyler's angina. There is no obvious dental abscess that can be drained. I did give him a dose of Norco here.     Disposition Considerations (include 1 Tests not done, Shared Decision Making, Pt Expectation of Test or Tx.): Patient is advised to continue alternating Tylenol and ibuprofen every 4 hours. He will continue taking his amoxicillin. He does have an appointment with the dental clinic on Friday. Patient denies dysphasia, dyspnea, neck stiffness or odynophagia. There is no brawny edema, uvular deviation, meningismus, stridor, trismus or drooling. I estimate there is LOW risk for a DEEP SPACE INFECTION (e.g., RYANS ANGINA OR RETROPHARYNGEAL ABSCESS), MENINGITIS, or AIRWAY COMPROMISE. There is also NO HEART MURMUR NOTED ON EXAM thus I consider the discharge disposition reasonable. Patient will be given the dental clinic list and advised to start calling first thing tomorrow morning to schedule a follow-up visit. Patient is advised to return to the emergency department with any concerns. Appropriate for outpatient management        I am the Primary Clinician of Record. FINAL IMPRESSION      1.  Pain due to dental caries          DISPOSITION/PLAN     DISPOSITION Decision To Discharge 02/06/2023 05:06:32 PM      PATIENT REFERRED TO:  Your Dentist    Schedule an appointment as soon as possible for a visit       DISCHARGE MEDICATIONS:  New Prescriptions    No medications on file       DISCONTINUED MEDICATIONS:  Discontinued Medications    No medications on file              (Please note that portions of this note were completed with a voice recognition program.  Efforts were made to edit the dictations but occasionally words are mis-transcribed.)    Cathryn Varner PA-C (electronically signed)           Cathryn Varner PA-C  02/06/23 2473